# Patient Record
Sex: FEMALE | Race: WHITE | NOT HISPANIC OR LATINO | Employment: OTHER | URBAN - METROPOLITAN AREA
[De-identification: names, ages, dates, MRNs, and addresses within clinical notes are randomized per-mention and may not be internally consistent; named-entity substitution may affect disease eponyms.]

---

## 2023-09-06 ENCOUNTER — MEDICAL CORRESPONDENCE (OUTPATIENT)
Dept: HEALTH INFORMATION MANAGEMENT | Facility: CLINIC | Age: 78
End: 2023-09-06
Payer: COMMERCIAL

## 2023-09-18 ENCOUNTER — ANESTHESIA EVENT (OUTPATIENT)
Dept: SURGERY | Facility: CLINIC | Age: 78
DRG: 165 | End: 2023-09-18
Payer: COMMERCIAL

## 2023-09-18 RX ORDER — UBIDECARENONE 30 MG
1 CAPSULE ORAL DAILY
COMMUNITY

## 2023-09-18 RX ORDER — EZETIMIBE 10 MG/1
10 TABLET ORAL DAILY
COMMUNITY

## 2023-09-18 RX ORDER — CETIRIZINE HYDROCHLORIDE 10 MG/1
10 TABLET ORAL DAILY
COMMUNITY

## 2023-09-18 RX ORDER — MULTIVIT WITH MINERALS/LUTEIN
1000 TABLET ORAL DAILY
COMMUNITY

## 2023-09-18 RX ORDER — BACLOFEN 20 MG
1 TABLET ORAL DAILY
COMMUNITY

## 2023-09-18 ASSESSMENT — LIFESTYLE VARIABLES: TOBACCO_USE: 1

## 2023-09-18 ASSESSMENT — ENCOUNTER SYMPTOMS: DYSRHYTHMIAS: 1

## 2023-09-18 NOTE — PROGRESS NOTES
PTA medications updated by Medication Scribe prior to surgery via phone call with patient (last doses completed by Nurse)     Medication history sources: Patient and H&P  In the past week, patient estimated taking medication this percent of the time: Greater than 90%      Significant changes made to the medication list:  None      Additional medication history information:   None    Medication reconciliation completed by provider prior to medication history? No    Time spent in this activity: 35 minutes    The information provided in this note is only as accurate as the sources available at the time of update(s)      Prior to Admission medications    Medication Sig Last Dose Taking? Auth Provider Long Term End Date   Calcium Carb-Cholecalciferol 600-10 MG-MCG CAPS Take 1 capsule by mouth 2 times daily 9/11/2023 at PM Yes Reported, Patient     cetirizine (ZYRTEC) 10 MG tablet Take 10 mg by mouth daily 9/11/2023 at AM Yes Reported, Patient     coenzyme Q-10 capsule Take 1 capsule by mouth daily 9/11/2023 at PM Yes Reported, Patient     ezetimibe (ZETIA) 10 MG tablet Take 10 mg by mouth daily  at AM Yes Reported, Patient Yes    Magnesium Oxide -Mg Supplement 500 MG TABS Take 1 tablet by mouth daily 9/11/2023 at PM Yes Reported, Patient     rivaroxaban ANTICOAGULANT (XARELTO) 20 MG TABS tablet Take 20 mg by mouth daily (with dinner) 9/15/2023 at PM Yes Reported, Patient Yes    vitamin C (ASCORBIC ACID) 1000 MG TABS Take 1,000 mg by mouth daily 9/11/2023 at AM Yes Reported, Patient

## 2023-09-18 NOTE — ANESTHESIA PREPROCEDURE EVALUATION
Anesthesia Pre-Procedure Evaluation    Patient: Rhianna Collins   MRN: [id number] : 1945        Procedure : Procedure(s):  RIGHT VIDEO ASSISTED THORACOSCOPY WEDGE RESECTION RIGHT LOWER LOBE LUNG NODULE ,  POSSILE RIGHT THORACOTOMY RIGHT LOWER LOBECTOMY          Past Medical History:   Diagnosis Date    Bifascicular block     Fitting and adjustment of cardiac pacemaker     Hyperlipidemia     Multinodular thyroid     Osteopenia     Paroxysmal atrial fibrillation (H)     Prediabetes     Right knee pain     Urinary incontinence     Vitamin D deficiency       Past Surgical History:   Procedure Laterality Date    APPENDECTOMY      cataract removal       COLONOSCOPY      GYN SURGERY      pacemaker placement      TUBAL LIGATION        Not on File   Social History     Tobacco Use    Smoking status: Not on file    Smokeless tobacco: Not on file   Substance Use Topics    Alcohol use: Not on file      Wt Readings from Last 1 Encounters:   No data found for Wt        Anesthesia Evaluation   Pt has had prior anesthetic.     No history of anesthetic complications       ROS/MED HX  ENT/Pulmonary: Comment: Lung mass    PFT's  Lung volumes normal    (+)                tobacco use, Past use,                      Neurologic:  - neg neurologic ROS     Cardiovascular: Comment: H/o bifascicular block  H/o NSVT    Stress test   Normal, EF 64%, no ischemia    (+) Dyslipidemia - -   -  - -   Taking blood thinners           pacemaker,          dysrhythmias, a-fib and Other,             METS/Exercise Tolerance: >4 METS    Hematologic:       Musculoskeletal:       GI/Hepatic:  - neg GI/hepatic ROS     Renal/Genitourinary:  - neg Renal ROS     Endo: Comment: H/o multinodular goiter      Psychiatric/Substance Use:  - neg psychiatric ROS     Infectious Disease:  - neg infectious disease ROS     Malignancy:       Other:            Physical Exam    Airway  airway exam normal      Mallampati: II   TM distance: > 3 FB   Neck ROM: full    Mouth opening: > 3 cm    Respiratory Devices and Support         Dental       (+) Minor Abnormalities - some fillings, tiny chips      Cardiovascular   cardiovascular exam normal          Pulmonary   pulmonary exam normal                OUTSIDE LABS:  CBC: No results found for: WBC, HGB, HCT, PLT  BMP: No results found for: NA, POTASSIUM, CHLORIDE, CO2, BUN, CR, GLC  COAGS: No results found for: PTT, INR, FIBR  POC: No results found for: BGM, HCG, HCGS  HEPATIC: No results found for: ALBUMIN, PROTTOTAL, ALT, AST, GGT, ALKPHOS, BILITOTAL, BILIDIRECT, QUIRINO  OTHER: No results found for: PH, LACT, A1C, DICKSON, PHOS, MAG, LIPASE, AMYLASE, TSH, T4, T3, CRP, SED    Anesthesia Plan    ASA Status:  2    NPO Status:  NPO Appropriate    Anesthesia Type: General.     - Airway: ETT   Induction: Intravenous.   Maintenance: Balanced.   Techniques and Equipment:     - Airway: Video-Laryngoscope, Double lumen ETT       Consents    Anesthesia Plan(s) and associated risks, benefits, and realistic alternatives discussed. Questions answered and patient/representative(s) expressed understanding.     - Discussed:     - Discussed with:  Patient            Postoperative Care    Pain management: IV analgesics, Multi-modal analgesia.   PONV prophylaxis: Ondansetron (or other 5HT-3), Dexamethasone or Solumedrol     Comments:                Pa Rivers MD

## 2023-09-19 ENCOUNTER — ANESTHESIA (OUTPATIENT)
Dept: SURGERY | Facility: CLINIC | Age: 78
DRG: 165 | End: 2023-09-19
Payer: COMMERCIAL

## 2023-09-19 ENCOUNTER — APPOINTMENT (OUTPATIENT)
Dept: GENERAL RADIOLOGY | Facility: CLINIC | Age: 78
DRG: 165 | End: 2023-09-19
Attending: THORACIC SURGERY (CARDIOTHORACIC VASCULAR SURGERY)
Payer: COMMERCIAL

## 2023-09-19 ENCOUNTER — HOSPITAL ENCOUNTER (INPATIENT)
Facility: CLINIC | Age: 78
LOS: 4 days | Discharge: HOME | DRG: 165 | End: 2023-09-23
Attending: THORACIC SURGERY (CARDIOTHORACIC VASCULAR SURGERY) | Admitting: THORACIC SURGERY (CARDIOTHORACIC VASCULAR SURGERY)
Payer: COMMERCIAL

## 2023-09-19 DIAGNOSIS — G89.18 ACUTE POST-OPERATIVE PAIN: Primary | ICD-10-CM

## 2023-09-19 DIAGNOSIS — K59.03 DRUG-INDUCED CONSTIPATION: ICD-10-CM

## 2023-09-19 PROBLEM — R91.1 NODULE OF LOWER LOBE OF RIGHT LUNG: Status: ACTIVE | Noted: 2023-09-19

## 2023-09-19 LAB
ABO/RH(D): NORMAL
ANION GAP SERPL CALCULATED.3IONS-SCNC: 11 MMOL/L (ref 7–15)
ANTIBODY SCREEN: NEGATIVE
BUN SERPL-MCNC: 9.6 MG/DL (ref 8–23)
CALCIUM SERPL-MCNC: 9.2 MG/DL (ref 8.8–10.2)
CHLORIDE SERPL-SCNC: 107 MMOL/L (ref 98–107)
CREAT SERPL-MCNC: 0.74 MG/DL (ref 0.51–0.95)
CREAT SERPL-MCNC: 0.87 MG/DL (ref 0.51–0.95)
DEPRECATED HCO3 PLAS-SCNC: 23 MMOL/L (ref 22–29)
EGFRCR SERPLBLD CKD-EPI 2021: 69 ML/MIN/1.73M2
EGFRCR SERPLBLD CKD-EPI 2021: 83 ML/MIN/1.73M2
ERYTHROCYTE [DISTWIDTH] IN BLOOD BY AUTOMATED COUNT: 13.2 % (ref 10–15)
GLUCOSE BLDC GLUCOMTR-MCNC: 170 MG/DL (ref 70–99)
GLUCOSE SERPL-MCNC: 144 MG/DL (ref 70–99)
HCT VFR BLD AUTO: 38.7 % (ref 35–47)
HGB BLD-MCNC: 12.4 G/DL (ref 11.7–15.7)
HOLD SPECIMEN: NORMAL
HOLD SPECIMEN: NORMAL
INR PPP: 1.03 (ref 0.85–1.15)
MCH RBC QN AUTO: 28.6 PG (ref 26.5–33)
MCHC RBC AUTO-ENTMCNC: 32 G/DL (ref 31.5–36.5)
MCV RBC AUTO: 89 FL (ref 78–100)
PLATELET # BLD AUTO: 272 10E3/UL (ref 150–450)
POTASSIUM SERPL-SCNC: 3.9 MMOL/L (ref 3.4–5.3)
RBC # BLD AUTO: 4.33 10E6/UL (ref 3.8–5.2)
SODIUM SERPL-SCNC: 141 MMOL/L (ref 136–145)
SPECIMEN EXPIRATION DATE: NORMAL
WBC # BLD AUTO: 4.8 10E3/UL (ref 4–11)

## 2023-09-19 PROCEDURE — 88305 TISSUE EXAM BY PATHOLOGIST: CPT | Mod: TC | Performed by: THORACIC SURGERY (CARDIOTHORACIC VASCULAR SURGERY)

## 2023-09-19 PROCEDURE — 272N000001 HC OR GENERAL SUPPLY STERILE: Performed by: THORACIC SURGERY (CARDIOTHORACIC VASCULAR SURGERY)

## 2023-09-19 PROCEDURE — P9045 ALBUMIN (HUMAN), 5%, 250 ML: HCPCS | Mod: JZ | Performed by: ANESTHESIOLOGY

## 2023-09-19 PROCEDURE — 36415 COLL VENOUS BLD VENIPUNCTURE: CPT | Performed by: PHYSICIAN ASSISTANT

## 2023-09-19 PROCEDURE — 250N000011 HC RX IP 250 OP 636: Mod: JZ | Performed by: NURSE ANESTHETIST, CERTIFIED REGISTERED

## 2023-09-19 PROCEDURE — 07B70ZZ EXCISION OF THORAX LYMPHATIC, OPEN APPROACH: ICD-10-PCS | Performed by: THORACIC SURGERY (CARDIOTHORACIC VASCULAR SURGERY)

## 2023-09-19 PROCEDURE — 0BBF4ZZ EXCISION OF RIGHT LOWER LUNG LOBE, PERCUTANEOUS ENDOSCOPIC APPROACH: ICD-10-PCS | Performed by: THORACIC SURGERY (CARDIOTHORACIC VASCULAR SURGERY)

## 2023-09-19 PROCEDURE — 88331 PATH CONSLTJ SURG 1 BLK 1SPC: CPT | Mod: TC | Performed by: THORACIC SURGERY (CARDIOTHORACIC VASCULAR SURGERY)

## 2023-09-19 PROCEDURE — 250N000009 HC RX 250: Performed by: THORACIC SURGERY (CARDIOTHORACIC VASCULAR SURGERY)

## 2023-09-19 PROCEDURE — 250N000011 HC RX IP 250 OP 636: Performed by: PHYSICIAN ASSISTANT

## 2023-09-19 PROCEDURE — 250N000011 HC RX IP 250 OP 636: Mod: JZ

## 2023-09-19 PROCEDURE — 86850 RBC ANTIBODY SCREEN: CPT | Performed by: THORACIC SURGERY (CARDIOTHORACIC VASCULAR SURGERY)

## 2023-09-19 PROCEDURE — 258N000003 HC RX IP 258 OP 636: Performed by: PHYSICIAN ASSISTANT

## 2023-09-19 PROCEDURE — P9045 ALBUMIN (HUMAN), 5%, 250 ML: HCPCS | Mod: JZ

## 2023-09-19 PROCEDURE — 250N000009 HC RX 250: Performed by: NURSE ANESTHETIST, CERTIFIED REGISTERED

## 2023-09-19 PROCEDURE — 250N000011 HC RX IP 250 OP 636: Mod: JZ | Performed by: ANESTHESIOLOGY

## 2023-09-19 PROCEDURE — 36415 COLL VENOUS BLD VENIPUNCTURE: CPT | Performed by: THORACIC SURGERY (CARDIOTHORACIC VASCULAR SURGERY)

## 2023-09-19 PROCEDURE — 0BTF0ZZ RESECTION OF RIGHT LOWER LUNG LOBE, OPEN APPROACH: ICD-10-PCS | Performed by: THORACIC SURGERY (CARDIOTHORACIC VASCULAR SURGERY)

## 2023-09-19 PROCEDURE — 999N000065 XR CHEST PORT 1 VIEW

## 2023-09-19 PROCEDURE — 85610 PROTHROMBIN TIME: CPT | Performed by: THORACIC SURGERY (CARDIOTHORACIC VASCULAR SURGERY)

## 2023-09-19 PROCEDURE — 999N000141 HC STATISTIC PRE-PROCEDURE NURSING ASSESSMENT: Performed by: THORACIC SURGERY (CARDIOTHORACIC VASCULAR SURGERY)

## 2023-09-19 PROCEDURE — 370N000017 HC ANESTHESIA TECHNICAL FEE, PER MIN: Performed by: THORACIC SURGERY (CARDIOTHORACIC VASCULAR SURGERY)

## 2023-09-19 PROCEDURE — 360N000077 HC SURGERY LEVEL 4, PER MIN: Performed by: THORACIC SURGERY (CARDIOTHORACIC VASCULAR SURGERY)

## 2023-09-19 PROCEDURE — 710N000010 HC RECOVERY PHASE 1, LEVEL 2, PER MIN: Performed by: THORACIC SURGERY (CARDIOTHORACIC VASCULAR SURGERY)

## 2023-09-19 PROCEDURE — 250N000009 HC RX 250

## 2023-09-19 PROCEDURE — 120N000001 HC R&B MED SURG/OB

## 2023-09-19 PROCEDURE — 85027 COMPLETE CBC AUTOMATED: CPT | Performed by: THORACIC SURGERY (CARDIOTHORACIC VASCULAR SURGERY)

## 2023-09-19 PROCEDURE — 250N000013 HC RX MED GY IP 250 OP 250 PS 637: Performed by: PHYSICIAN ASSISTANT

## 2023-09-19 PROCEDURE — 82565 ASSAY OF CREATININE: CPT | Performed by: PHYSICIAN ASSISTANT

## 2023-09-19 PROCEDURE — 0BBF0ZX EXCISION OF RIGHT LOWER LUNG LOBE, OPEN APPROACH, DIAGNOSTIC: ICD-10-PCS | Performed by: THORACIC SURGERY (CARDIOTHORACIC VASCULAR SURGERY)

## 2023-09-19 PROCEDURE — 258N000003 HC RX IP 258 OP 636

## 2023-09-19 PROCEDURE — 80048 BASIC METABOLIC PNL TOTAL CA: CPT | Performed by: THORACIC SURGERY (CARDIOTHORACIC VASCULAR SURGERY)

## 2023-09-19 PROCEDURE — 250N000011 HC RX IP 250 OP 636: Performed by: THORACIC SURGERY (CARDIOTHORACIC VASCULAR SURGERY)

## 2023-09-19 PROCEDURE — 250N000025 HC SEVOFLURANE, PER MIN: Performed by: THORACIC SURGERY (CARDIOTHORACIC VASCULAR SURGERY)

## 2023-09-19 PROCEDURE — 86901 BLOOD TYPING SEROLOGIC RH(D): CPT | Performed by: THORACIC SURGERY (CARDIOTHORACIC VASCULAR SURGERY)

## 2023-09-19 PROCEDURE — 258N000003 HC RX IP 258 OP 636: Performed by: ANESTHESIOLOGY

## 2023-09-19 RX ORDER — HYDROMORPHONE HCL IN WATER/PF 6 MG/30 ML
0.4 PATIENT CONTROLLED ANALGESIA SYRINGE INTRAVENOUS
Status: DISCONTINUED | OUTPATIENT
Start: 2023-09-19 | End: 2023-09-23 | Stop reason: HOSPADM

## 2023-09-19 RX ORDER — CALCIUM CARBONATE 500 MG/1
500 TABLET, CHEWABLE ORAL 4 TIMES DAILY PRN
Status: DISCONTINUED | OUTPATIENT
Start: 2023-09-19 | End: 2023-09-23 | Stop reason: HOSPADM

## 2023-09-19 RX ORDER — LIDOCAINE 40 MG/G
CREAM TOPICAL
Status: DISCONTINUED | OUTPATIENT
Start: 2023-09-19 | End: 2023-09-19

## 2023-09-19 RX ORDER — LIDOCAINE HYDROCHLORIDE 20 MG/ML
INJECTION, SOLUTION INFILTRATION; PERINEURAL PRN
Status: DISCONTINUED | OUTPATIENT
Start: 2023-09-19 | End: 2023-09-19

## 2023-09-19 RX ORDER — LIDOCAINE 40 MG/G
CREAM TOPICAL
Status: DISCONTINUED | OUTPATIENT
Start: 2023-09-19 | End: 2023-09-23 | Stop reason: HOSPADM

## 2023-09-19 RX ORDER — DIPHENHYDRAMINE HCL 12.5MG/5ML
12.5 LIQUID (ML) ORAL EVERY 6 HOURS PRN
Status: DISCONTINUED | OUTPATIENT
Start: 2023-09-19 | End: 2023-09-23 | Stop reason: HOSPADM

## 2023-09-19 RX ORDER — ENOXAPARIN SODIUM 100 MG/ML
40 INJECTION SUBCUTANEOUS EVERY 24 HOURS
Status: DISCONTINUED | OUTPATIENT
Start: 2023-09-20 | End: 2023-09-23 | Stop reason: HOSPADM

## 2023-09-19 RX ORDER — CETIRIZINE HYDROCHLORIDE 10 MG/1
10 TABLET ORAL DAILY
Status: DISCONTINUED | OUTPATIENT
Start: 2023-09-20 | End: 2023-09-23 | Stop reason: HOSPADM

## 2023-09-19 RX ORDER — AMOXICILLIN 250 MG
1 CAPSULE ORAL 2 TIMES DAILY
Status: DISCONTINUED | OUTPATIENT
Start: 2023-09-19 | End: 2023-09-23 | Stop reason: HOSPADM

## 2023-09-19 RX ORDER — SODIUM CHLORIDE 9 MG/ML
INJECTION, SOLUTION INTRAVENOUS CONTINUOUS
Status: DISCONTINUED | OUTPATIENT
Start: 2023-09-19 | End: 2023-09-21

## 2023-09-19 RX ORDER — CEFAZOLIN SODIUM/WATER 2 G/20 ML
2 SYRINGE (ML) INTRAVENOUS SEE ADMIN INSTRUCTIONS
Status: DISCONTINUED | OUTPATIENT
Start: 2023-09-19 | End: 2023-09-19 | Stop reason: HOSPADM

## 2023-09-19 RX ORDER — NALOXONE HYDROCHLORIDE 0.4 MG/ML
0.2 INJECTION, SOLUTION INTRAMUSCULAR; INTRAVENOUS; SUBCUTANEOUS
Status: DISCONTINUED | OUTPATIENT
Start: 2023-09-19 | End: 2023-09-23 | Stop reason: HOSPADM

## 2023-09-19 RX ORDER — PROCHLORPERAZINE MALEATE 5 MG
5 TABLET ORAL EVERY 6 HOURS PRN
Status: DISCONTINUED | OUTPATIENT
Start: 2023-09-19 | End: 2023-09-23 | Stop reason: HOSPADM

## 2023-09-19 RX ORDER — ONDANSETRON 4 MG/1
4 TABLET, ORALLY DISINTEGRATING ORAL EVERY 30 MIN PRN
Status: DISCONTINUED | OUTPATIENT
Start: 2023-09-19 | End: 2023-09-19 | Stop reason: HOSPADM

## 2023-09-19 RX ORDER — HYDROMORPHONE HCL IN WATER/PF 6 MG/30 ML
0.2 PATIENT CONTROLLED ANALGESIA SYRINGE INTRAVENOUS
Status: DISCONTINUED | OUTPATIENT
Start: 2023-09-19 | End: 2023-09-23 | Stop reason: HOSPADM

## 2023-09-19 RX ORDER — VASOPRESSIN IN 0.9 % NACL 2 UNIT/2ML
SYRINGE (ML) INTRAVENOUS PRN
Status: DISCONTINUED | OUTPATIENT
Start: 2023-09-19 | End: 2023-09-19

## 2023-09-19 RX ORDER — ONDANSETRON 2 MG/ML
4 INJECTION INTRAMUSCULAR; INTRAVENOUS EVERY 6 HOURS PRN
Status: DISCONTINUED | OUTPATIENT
Start: 2023-09-19 | End: 2023-09-23 | Stop reason: HOSPADM

## 2023-09-19 RX ORDER — NALOXONE HYDROCHLORIDE 0.4 MG/ML
0.4 INJECTION, SOLUTION INTRAMUSCULAR; INTRAVENOUS; SUBCUTANEOUS
Status: DISCONTINUED | OUTPATIENT
Start: 2023-09-19 | End: 2023-09-23 | Stop reason: HOSPADM

## 2023-09-19 RX ORDER — SODIUM CHLORIDE, SODIUM LACTATE, POTASSIUM CHLORIDE, CALCIUM CHLORIDE 600; 310; 30; 20 MG/100ML; MG/100ML; MG/100ML; MG/100ML
INJECTION, SOLUTION INTRAVENOUS CONTINUOUS
Status: DISCONTINUED | OUTPATIENT
Start: 2023-09-19 | End: 2023-09-19 | Stop reason: HOSPADM

## 2023-09-19 RX ORDER — HYDROMORPHONE HCL IN WATER/PF 6 MG/30 ML
0.4 PATIENT CONTROLLED ANALGESIA SYRINGE INTRAVENOUS EVERY 5 MIN PRN
Status: DISCONTINUED | OUTPATIENT
Start: 2023-09-19 | End: 2023-09-19 | Stop reason: HOSPADM

## 2023-09-19 RX ORDER — PHENYLEPHRINE HCL IN 0.9% NACL 50MG/250ML
.5-1.25 PLASTIC BAG, INJECTION (ML) INTRAVENOUS CONTINUOUS
Status: DISCONTINUED | OUTPATIENT
Start: 2023-09-19 | End: 2023-09-19 | Stop reason: HOSPADM

## 2023-09-19 RX ORDER — GINSENG 100 MG
CAPSULE ORAL
Status: DISCONTINUED | OUTPATIENT
Start: 2023-09-19 | End: 2023-09-23 | Stop reason: HOSPADM

## 2023-09-19 RX ORDER — ONDANSETRON 2 MG/ML
INJECTION INTRAMUSCULAR; INTRAVENOUS PRN
Status: DISCONTINUED | OUTPATIENT
Start: 2023-09-19 | End: 2023-09-19

## 2023-09-19 RX ORDER — HYDROMORPHONE HCL IN WATER/PF 6 MG/30 ML
0.2 PATIENT CONTROLLED ANALGESIA SYRINGE INTRAVENOUS EVERY 5 MIN PRN
Status: DISCONTINUED | OUTPATIENT
Start: 2023-09-19 | End: 2023-09-19 | Stop reason: HOSPADM

## 2023-09-19 RX ORDER — EPHEDRINE SULFATE 50 MG/ML
INJECTION, SOLUTION INTRAMUSCULAR; INTRAVENOUS; SUBCUTANEOUS PRN
Status: DISCONTINUED | OUTPATIENT
Start: 2023-09-19 | End: 2023-09-19

## 2023-09-19 RX ORDER — HYDROMORPHONE HYDROCHLORIDE 2 MG/1
2 TABLET ORAL
Status: DISCONTINUED | OUTPATIENT
Start: 2023-09-19 | End: 2023-09-23 | Stop reason: HOSPADM

## 2023-09-19 RX ORDER — FENTANYL CITRATE 50 UG/ML
INJECTION, SOLUTION INTRAMUSCULAR; INTRAVENOUS PRN
Status: DISCONTINUED | OUTPATIENT
Start: 2023-09-19 | End: 2023-09-19

## 2023-09-19 RX ORDER — PROPOFOL 10 MG/ML
INJECTION, EMULSION INTRAVENOUS PRN
Status: DISCONTINUED | OUTPATIENT
Start: 2023-09-19 | End: 2023-09-19

## 2023-09-19 RX ORDER — ONDANSETRON 2 MG/ML
4 INJECTION INTRAMUSCULAR; INTRAVENOUS EVERY 30 MIN PRN
Status: DISCONTINUED | OUTPATIENT
Start: 2023-09-19 | End: 2023-09-19 | Stop reason: HOSPADM

## 2023-09-19 RX ORDER — KETOROLAC TROMETHAMINE 15 MG/ML
15 INJECTION, SOLUTION INTRAMUSCULAR; INTRAVENOUS EVERY 6 HOURS PRN
Status: DISCONTINUED | OUTPATIENT
Start: 2023-09-19 | End: 2023-09-23 | Stop reason: HOSPADM

## 2023-09-19 RX ORDER — CEFAZOLIN SODIUM/WATER 2 G/20 ML
2 SYRINGE (ML) INTRAVENOUS
Status: COMPLETED | OUTPATIENT
Start: 2023-09-19 | End: 2023-09-19

## 2023-09-19 RX ORDER — FAMOTIDINE 20 MG/1
20 TABLET, FILM COATED ORAL 2 TIMES DAILY
Status: DISCONTINUED | OUTPATIENT
Start: 2023-09-19 | End: 2023-09-23 | Stop reason: HOSPADM

## 2023-09-19 RX ORDER — FENTANYL CITRATE 0.05 MG/ML
50 INJECTION, SOLUTION INTRAMUSCULAR; INTRAVENOUS EVERY 5 MIN PRN
Status: DISCONTINUED | OUTPATIENT
Start: 2023-09-19 | End: 2023-09-19 | Stop reason: HOSPADM

## 2023-09-19 RX ORDER — EZETIMIBE 10 MG/1
10 TABLET ORAL DAILY
Status: DISCONTINUED | OUTPATIENT
Start: 2023-09-20 | End: 2023-09-23 | Stop reason: HOSPADM

## 2023-09-19 RX ORDER — DIPHENHYDRAMINE HYDROCHLORIDE 50 MG/ML
12.5 INJECTION INTRAMUSCULAR; INTRAVENOUS EVERY 6 HOURS PRN
Status: DISCONTINUED | OUTPATIENT
Start: 2023-09-19 | End: 2023-09-23 | Stop reason: HOSPADM

## 2023-09-19 RX ORDER — ONDANSETRON 4 MG/1
4 TABLET, ORALLY DISINTEGRATING ORAL EVERY 6 HOURS PRN
Status: DISCONTINUED | OUTPATIENT
Start: 2023-09-19 | End: 2023-09-23 | Stop reason: HOSPADM

## 2023-09-19 RX ORDER — NITROGLYCERIN 0.4 MG/1
0.4 TABLET SUBLINGUAL EVERY 5 MIN PRN
Status: DISCONTINUED | OUTPATIENT
Start: 2023-09-19 | End: 2023-09-20

## 2023-09-19 RX ORDER — ACETAMINOPHEN 500 MG
1000 TABLET ORAL EVERY 6 HOURS
Status: DISCONTINUED | OUTPATIENT
Start: 2023-09-19 | End: 2023-09-23 | Stop reason: HOSPADM

## 2023-09-19 RX ORDER — POLYETHYLENE GLYCOL 3350 17 G/17G
17 POWDER, FOR SOLUTION ORAL DAILY
Status: DISCONTINUED | OUTPATIENT
Start: 2023-09-20 | End: 2023-09-23 | Stop reason: HOSPADM

## 2023-09-19 RX ORDER — FENTANYL CITRATE 0.05 MG/ML
25 INJECTION, SOLUTION INTRAMUSCULAR; INTRAVENOUS EVERY 5 MIN PRN
Status: DISCONTINUED | OUTPATIENT
Start: 2023-09-19 | End: 2023-09-19 | Stop reason: HOSPADM

## 2023-09-19 RX ADMIN — Medication 15 MG: at 10:17

## 2023-09-19 RX ADMIN — ALBUMIN HUMAN 12.5 G: 0.05 INJECTION, SOLUTION INTRAVENOUS at 15:37

## 2023-09-19 RX ADMIN — ACETAMINOPHEN 1000 MG: 500 TABLET, FILM COATED ORAL at 17:18

## 2023-09-19 RX ADMIN — Medication 1 UNITS: at 11:39

## 2023-09-19 RX ADMIN — Medication 5 MG: at 10:38

## 2023-09-19 RX ADMIN — SODIUM CHLORIDE, POTASSIUM CHLORIDE, SODIUM LACTATE AND CALCIUM CHLORIDE: 600; 310; 30; 20 INJECTION, SOLUTION INTRAVENOUS at 11:40

## 2023-09-19 RX ADMIN — PHENYLEPHRINE HYDROCHLORIDE 200 MCG: 10 INJECTION INTRAVENOUS at 11:51

## 2023-09-19 RX ADMIN — ACETAMINOPHEN 1000 MG: 500 TABLET, FILM COATED ORAL at 23:00

## 2023-09-19 RX ADMIN — ROCURONIUM BROMIDE 5 MG: 50 INJECTION, SOLUTION INTRAVENOUS at 11:37

## 2023-09-19 RX ADMIN — Medication 1 UNITS: at 10:48

## 2023-09-19 RX ADMIN — FENTANYL CITRATE 50 MCG: 50 INJECTION, SOLUTION INTRAMUSCULAR; INTRAVENOUS at 09:46

## 2023-09-19 RX ADMIN — Medication 0.5 UNITS: at 10:11

## 2023-09-19 RX ADMIN — PROPOFOL 170 MG: 10 INJECTION, EMULSION INTRAVENOUS at 09:46

## 2023-09-19 RX ADMIN — PHENYLEPHRINE HYDROCHLORIDE 100 MCG: 10 INJECTION INTRAVENOUS at 11:08

## 2023-09-19 RX ADMIN — Medication 5 MG: at 10:29

## 2023-09-19 RX ADMIN — Medication 1 UNITS: at 11:58

## 2023-09-19 RX ADMIN — FAMOTIDINE 20 MG: 20 TABLET ORAL at 19:51

## 2023-09-19 RX ADMIN — SODIUM CHLORIDE: 9 INJECTION, SOLUTION INTRAVENOUS at 17:06

## 2023-09-19 RX ADMIN — PHENYLEPHRINE HYDROCHLORIDE 100 MCG: 10 INJECTION INTRAVENOUS at 10:01

## 2023-09-19 RX ADMIN — KETOROLAC TROMETHAMINE 15 MG: 15 INJECTION INTRAMUSCULAR; INTRAVENOUS at 17:17

## 2023-09-19 RX ADMIN — FENTANYL CITRATE 50 MCG: 50 INJECTION, SOLUTION INTRAMUSCULAR; INTRAVENOUS at 12:25

## 2023-09-19 RX ADMIN — ALBUMIN HUMAN: 0.05 INJECTION, SOLUTION INTRAVENOUS at 10:17

## 2023-09-19 RX ADMIN — PHENYLEPHRINE HYDROCHLORIDE 200 MCG: 10 INJECTION INTRAVENOUS at 10:17

## 2023-09-19 RX ADMIN — LIDOCAINE HYDROCHLORIDE 100 MG: 20 INJECTION, SOLUTION INFILTRATION; PERINEURAL at 09:46

## 2023-09-19 RX ADMIN — PHENYLEPHRINE HYDROCHLORIDE 100 MCG: 10 INJECTION INTRAVENOUS at 11:32

## 2023-09-19 RX ADMIN — PHENYLEPHRINE HYDROCHLORIDE 100 MCG: 10 INJECTION INTRAVENOUS at 11:03

## 2023-09-19 RX ADMIN — PHENYLEPHRINE HYDROCHLORIDE 200 MCG: 10 INJECTION INTRAVENOUS at 09:52

## 2023-09-19 RX ADMIN — SENNOSIDES AND DOCUSATE SODIUM 1 TABLET: 50; 8.6 TABLET ORAL at 19:51

## 2023-09-19 RX ADMIN — ROCURONIUM BROMIDE 10 MG: 50 INJECTION, SOLUTION INTRAVENOUS at 10:54

## 2023-09-19 RX ADMIN — ONDANSETRON 4 MG: 2 INJECTION INTRAMUSCULAR; INTRAVENOUS at 11:28

## 2023-09-19 RX ADMIN — PHENYLEPHRINE HYDROCHLORIDE 100 MCG: 10 INJECTION INTRAVENOUS at 12:31

## 2023-09-19 RX ADMIN — PHENYLEPHRINE HYDROCHLORIDE 100 MCG: 10 INJECTION INTRAVENOUS at 11:04

## 2023-09-19 RX ADMIN — SUGAMMADEX 200 MG: 100 INJECTION, SOLUTION INTRAVENOUS at 12:21

## 2023-09-19 RX ADMIN — PHENYLEPHRINE HYDROCHLORIDE 100 MCG: 10 INJECTION INTRAVENOUS at 09:55

## 2023-09-19 RX ADMIN — Medication 10 MG: at 10:44

## 2023-09-19 RX ADMIN — Medication 0.5 UNITS: at 10:14

## 2023-09-19 RX ADMIN — ALBUMIN HUMAN: 0.05 INJECTION, SOLUTION INTRAVENOUS at 10:39

## 2023-09-19 RX ADMIN — ROCURONIUM BROMIDE 10 MG: 50 INJECTION, SOLUTION INTRAVENOUS at 10:22

## 2023-09-19 RX ADMIN — Medication 2 G: at 09:40

## 2023-09-19 RX ADMIN — SODIUM CHLORIDE, POTASSIUM CHLORIDE, SODIUM LACTATE AND CALCIUM CHLORIDE: 600; 310; 30; 20 INJECTION, SOLUTION INTRAVENOUS at 09:37

## 2023-09-19 RX ADMIN — Medication 5 MG: at 10:41

## 2023-09-19 RX ADMIN — PHENYLEPHRINE HYDROCHLORIDE 200 MCG: 10 INJECTION INTRAVENOUS at 10:10

## 2023-09-19 RX ADMIN — PHENYLEPHRINE HYDROCHLORIDE 100 MCG: 10 INJECTION INTRAVENOUS at 11:39

## 2023-09-19 RX ADMIN — Medication 1 UNITS: at 10:16

## 2023-09-19 RX ADMIN — PHENYLEPHRINE HYDROCHLORIDE 0.3 MCG/KG/MIN: 10 INJECTION INTRAVENOUS at 10:00

## 2023-09-19 RX ADMIN — PHENYLEPHRINE HYDROCHLORIDE 200 MCG: 10 INJECTION INTRAVENOUS at 10:05

## 2023-09-19 RX ADMIN — Medication 10 MG: at 10:18

## 2023-09-19 RX ADMIN — ROCURONIUM BROMIDE 40 MG: 50 INJECTION, SOLUTION INTRAVENOUS at 09:47

## 2023-09-19 RX ADMIN — Medication 0.3 MCG/KG/MIN: at 13:48

## 2023-09-19 RX ADMIN — ROCURONIUM BROMIDE 15 MG: 50 INJECTION, SOLUTION INTRAVENOUS at 11:44

## 2023-09-19 RX ADMIN — KETOROLAC TROMETHAMINE 15 MG: 15 INJECTION INTRAMUSCULAR; INTRAVENOUS at 23:01

## 2023-09-19 RX ADMIN — PHENYLEPHRINE HYDROCHLORIDE 100 MCG: 10 INJECTION INTRAVENOUS at 10:14

## 2023-09-19 ASSESSMENT — ACTIVITIES OF DAILY LIVING (ADL)
ADLS_ACUITY_SCORE: 20
ADLS_ACUITY_SCORE: 20
ADLS_ACUITY_SCORE: 22
ADLS_ACUITY_SCORE: 20

## 2023-09-19 NOTE — OP NOTE
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  This was hypermetabolic on the PET/CT scan.  There is no evidence of martina or distant disease.  Pulmonary function tests are normal.  Options of diagnostic procedure and treatment were discussed in details and it was elected to proceed with resection for diagnosis and treatment      DESCRIPTION OF PROCEDURE: The patient was brought to the operating room and placed in supine position.  Under general anesthesia with a double-lumen endotracheal tube, the patient was placed in the left lateral decubitus position.  The right chest was prepared and draped in usual fashion using ChloraPrep.  The ventilation of the right lung was discontinued.  3 thoracoscopic incisions were made in usual fashion.  The video thoracoscope was introduced.  The masslike opacity in the right lower lobe lung was easily identified.  Anteriorly near the fissure, there was another area which appeared abnormal and this was resection with 2 applications of [place] 60 gold stapling device.  The larger area could not be resected safely with thoracoscopy.  The posterior thoracoscopic incision was enlarged and limited thoracotomy.  The serratus anterior muscle was spared.  Pleural space was entered and the fifth costal space preserving the integrity of the rib.  Using multiple application of [place]  60 gold stapling device a wedge section of the masslike area was done.  This was submitted for frozen section.  Frozen section shows a mucinous adenocarcinoma.  The inferior pulmonary ligament was mobilized.  Mediastinal lymph node dissection was performed excising the subcarinal and right paratracheal lymph node as well as an interlobar lymph node.  There was no lymph node at the tracheobronchial angle or inferior pulmonary ligament.  The fissure was entered exposing the pulmonary artery.  Fissure were complete anteriorly and posteriorly with application of [place] 60 gold stapling device.  Pulmonary artery was dissected circumferentially.  The branch of the pulmonary artery to the superior segment of the right lower lobe lung was stapled application of [place] 35 vascular stapling device.  The basilar trunk of the pulmonary artery was dissected and stable in similar fashion.  Then the inferior pulmonary vein was stapled application of [place] 35 vascular stapling device.  The origin of the right lower bronchus was dissected circumferentially and stapled beyond its origin with a TA 30 3.5 stapling device.  The bronchus was divided distal to the staple line completing right lower lobectomy.  The bronchial stump was airtight at pressure 20 cmH2O with an excellent reexpansion of the middle and upper lobe.  Hemostasis was verified and was excellent.  Through a separate stab wound a 28 straight chest was placed and sutured skin with 2 silk suture.  On-Q catheter were placed.  30 cc of Marcaine 0.5% without epinephrine was injected intercostal blocks.  The incision was closed with pericostal suture Vicryl No. 1, running #1 Vicryl muscular layer, running 2-0 Vicryl subcu tissue and the skin closed with INSORB staples.    ESTIMATED BLOOD LOSS: 25 mL    COUNTS: Needle and sponge count is correct    Nathalie Leahy PA-C  was the first assistant during the procedure. Her role as first assistant during the procedure was essential  and necessary to accomplishing the steps described in the procedure above, providing exposure, retraction and handling of the scope.     Peter Patel MD

## 2023-09-19 NOTE — PLAN OF CARE
Arrived @ 1700     Orientations: A&Ox4   Vitals/Pain: VSS (soft BP), LS diminished. RA. Pain 6/10   Tele: SR w/ BBB (pacemaker)   Lines/Drains: PIV L Infusing   Skin/Wounds: R thora incision , R CT (-20 suction)  GI/: Purewick in place, No BM (passing gas and + BS). Clear liquid diet   Labs: Abnormal/Trends: BG (170)  Electrolyte Replacement- N/A  Ambulation/Assist: NOOB   Plan: Monitor incision, On-q pump site, CT site and output, and pain. Toradol and tylenol given, ice applied to incision. Spouse and daughter at bedside. No AL present, clamps at bedside, IS encouraged and education provided

## 2023-09-19 NOTE — PROVIDER NOTIFICATION
FREDY Rivers bedside. Pt unable to maintain systolic over 90 with out liss support. Dr Patel text. 250 Albumin ordered. Pt A&O x4. Lungs clear bilaterally.

## 2023-09-19 NOTE — PROGRESS NOTES
Pt vitally stable. Silvestre DANIEL bedside ordered to titrate liss down to see if patient tolerates. Tolerating sitting up  45 deg. Vss. ctm

## 2023-09-19 NOTE — PROGRESS NOTES
FREDY Rivers bedside. Pt stable on liss drip. Unable to discontinue liss and keep systolic above 90. CTM and give more time per MD Rivers.

## 2023-09-19 NOTE — ANESTHESIA PROCEDURE NOTES
Airway       Patient location during procedure: OR       Procedure Start/Stop Times: 9/19/2023 9:50 AM  Staff -        Anesthesiologist:  Pa Rivers MD       CRNA: Bennie Tamayo APRN CRNA       Other Anesthesia Staff: Araseli Acosta       Performed By: SRNA  Consent for Airway        Urgency: elective  Indications and Patient Condition       Indications for airway management: jasmin-procedural       Induction type:intravenous       Mask difficulty assessment: 2 - vent by mask + OA or adjuvant +/- NMBA    Final Airway Details       Final airway type: endotracheal airway       Successful airway: ETT - double lumen left  Endotracheal Airway Details        Cuffed: yes       Successful intubation technique: video laryngoscopy       VL Blade Size: Glidescope 3       Grade View of Cords: 1       Adjucts: stylet       Position: Right       Bite block used: None       ETT Double lumen (fr): 35    Post intubation assessment        Placement verified by: capnometry, equal breath sounds and chest rise        Number of attempts at approach: 1       Number of other approaches attempted: 0       Secured with: silk tape       Ease of procedure: easy       Dentition: Intact and Unchanged    Medication(s) Administered   Medication Administration Time: 9/19/2023 9:50 AM

## 2023-09-19 NOTE — PROGRESS NOTES
DR Rivers bedside. 250 Albumin complete. Leonid drip stopped @1538. VSS. FREDY Rivers cleared to transfer to floor.

## 2023-09-19 NOTE — ANESTHESIA CARE TRANSFER NOTE
Patient: Rhianna Collins    Procedure: Procedure(s):  RIGHT VIDEO ASSISTED THORACOSCOPIC SURGERY WEDGE RESECTION OF RIGHT LOWER LOBE LUNG NODULE,  CONVERTED TO RIGHT THORACOTOMY, MEDIASTINAL LYMPH NODE DISSECTIONS, RIGHT LOWER LOBECTOMY       Diagnosis: Right lower lobe lung mass [R91.8]  Diagnosis Additional Information: No value filed.    Anesthesia Type:   General     Note:    Oropharynx: oropharynx clear of all foreign objects and spontaneously breathing  Level of Consciousness: awake  Oxygen Supplementation: face mask  Level of Supplemental Oxygen (L/min / FiO2): 6  Independent Airway: airway patency satisfactory and stable  Dentition: dentition unchanged  Vital Signs Stable: post-procedure vital signs reviewed and stable  Report to RN Given: handoff report given  Patient transferred to: PACU    Handoff Report: Identifed the Patient, Identified the Reponsible Provider, Reviewed the pertinent medical history, Discussed the surgical course, Reviewed Intra-OP anesthesia mangement and issues during anesthesia, Set expectations for post-procedure period and Allowed opportunity for questions and acknowledgement of understanding      Vitals:  Vitals Value Taken Time   /68 09/19/23 1239   Temp     Pulse 95 09/19/23 1243   Resp 22 09/19/23 1243   SpO2 99 % 09/19/23 1243       Electronically Signed By: LEXI Etienne CRNA  September 19, 2023  12:44 PM

## 2023-09-19 NOTE — ANESTHESIA POSTPROCEDURE EVALUATION
Patient: Rhianna Collins    Procedure: Procedure(s):  RIGHT VIDEO ASSISTED THORACOSCOPIC SURGERY WEDGE RESECTION OF RIGHT LOWER LOBE LUNG NODULE,  CONVERTED TO RIGHT THORACOTOMY, MEDIASTINAL LYMPH NODE DISSECTIONS, RIGHT LOWER LOBECTOMY       Anesthesia Type:  General    Note:  Disposition: Admission   Postop Pain Control: Uneventful            Sign Out: Well controlled pain   PONV: No   Neuro/Psych: Uneventful            Sign Out: Acceptable/Baseline neuro status   Airway/Respiratory: Uneventful            Sign Out: Acceptable/Baseline resp. status   CV/Hemodynamics:             Sign Out: Acceptable CV status; No obvious hypovolemia; No obvious fluid overload   Other NRE: NONE   DID A NON-ROUTINE EVENT OCCUR? No    Event details/Postop Comments:  Took some time in PACU to wean phenylephrine drip. Eventually with time and albumin, was able to discontinue drip without hypotension. Doing well. Please call with questions.           Last vitals:  Vitals Value Taken Time   BP 99/63 09/19/23 1640   Temp     Pulse 80 09/19/23 1645   Resp 27 09/19/23 1645   SpO2 99 % 09/19/23 1644   Vitals shown include unvalidated device data.    Electronically Signed By: Pa Rivers MD  September 19, 2023  4:59 PM

## 2023-09-20 ENCOUNTER — APPOINTMENT (OUTPATIENT)
Dept: GENERAL RADIOLOGY | Facility: CLINIC | Age: 78
DRG: 165 | End: 2023-09-20
Attending: PHYSICIAN ASSISTANT
Payer: COMMERCIAL

## 2023-09-20 LAB
ANION GAP SERPL CALCULATED.3IONS-SCNC: 10 MMOL/L (ref 7–15)
BUN SERPL-MCNC: 14 MG/DL (ref 8–23)
CALCIUM SERPL-MCNC: 8.3 MG/DL (ref 8.8–10.2)
CHLORIDE SERPL-SCNC: 104 MMOL/L (ref 98–107)
CREAT SERPL-MCNC: 0.87 MG/DL (ref 0.51–0.95)
DEPRECATED HCO3 PLAS-SCNC: 23 MMOL/L (ref 22–29)
EGFRCR SERPLBLD CKD-EPI 2021: 69 ML/MIN/1.73M2
GLUCOSE BLDC GLUCOMTR-MCNC: 128 MG/DL (ref 70–99)
GLUCOSE SERPL-MCNC: 130 MG/DL (ref 70–99)
HGB BLD-MCNC: 10.3 G/DL (ref 11.7–15.7)
POTASSIUM SERPL-SCNC: 3.8 MMOL/L (ref 3.4–5.3)
SODIUM SERPL-SCNC: 137 MMOL/L (ref 136–145)

## 2023-09-20 PROCEDURE — 85018 HEMOGLOBIN: CPT | Performed by: PHYSICIAN ASSISTANT

## 2023-09-20 PROCEDURE — 36415 COLL VENOUS BLD VENIPUNCTURE: CPT | Performed by: PHYSICIAN ASSISTANT

## 2023-09-20 PROCEDURE — 88309 TISSUE EXAM BY PATHOLOGIST: CPT | Mod: 26 | Performed by: PATHOLOGY

## 2023-09-20 PROCEDURE — 88331 PATH CONSLTJ SURG 1 BLK 1SPC: CPT | Mod: 26 | Performed by: PATHOLOGY

## 2023-09-20 PROCEDURE — 80048 BASIC METABOLIC PNL TOTAL CA: CPT | Performed by: PHYSICIAN ASSISTANT

## 2023-09-20 PROCEDURE — 250N000011 HC RX IP 250 OP 636: Mod: JZ | Performed by: PHYSICIAN ASSISTANT

## 2023-09-20 PROCEDURE — 250N000013 HC RX MED GY IP 250 OP 250 PS 637: Performed by: PHYSICIAN ASSISTANT

## 2023-09-20 PROCEDURE — 88305 TISSUE EXAM BY PATHOLOGIST: CPT | Mod: 26 | Performed by: PATHOLOGY

## 2023-09-20 PROCEDURE — 120N000001 HC R&B MED SURG/OB

## 2023-09-20 PROCEDURE — 88307 TISSUE EXAM BY PATHOLOGIST: CPT | Mod: 26 | Performed by: PATHOLOGY

## 2023-09-20 PROCEDURE — 71045 X-RAY EXAM CHEST 1 VIEW: CPT

## 2023-09-20 PROCEDURE — 88360 TUMOR IMMUNOHISTOCHEM/MANUAL: CPT | Mod: 26 | Performed by: PATHOLOGY

## 2023-09-20 RX ADMIN — ACETAMINOPHEN 1000 MG: 500 TABLET, FILM COATED ORAL at 10:29

## 2023-09-20 RX ADMIN — POLYETHYLENE GLYCOL 3350 17 G: 17 POWDER, FOR SOLUTION ORAL at 08:12

## 2023-09-20 RX ADMIN — FAMOTIDINE 20 MG: 20 TABLET ORAL at 08:12

## 2023-09-20 RX ADMIN — FAMOTIDINE 20 MG: 20 TABLET ORAL at 19:33

## 2023-09-20 RX ADMIN — SENNOSIDES AND DOCUSATE SODIUM 1 TABLET: 50; 8.6 TABLET ORAL at 08:12

## 2023-09-20 RX ADMIN — CETIRIZINE HYDROCHLORIDE 10 MG: 10 TABLET, FILM COATED ORAL at 08:11

## 2023-09-20 RX ADMIN — ACETAMINOPHEN 1000 MG: 500 TABLET, FILM COATED ORAL at 17:13

## 2023-09-20 RX ADMIN — ACETAMINOPHEN 1000 MG: 500 TABLET, FILM COATED ORAL at 05:40

## 2023-09-20 RX ADMIN — EZETIMIBE 10 MG: 10 TABLET ORAL at 10:29

## 2023-09-20 RX ADMIN — SENNOSIDES AND DOCUSATE SODIUM 1 TABLET: 50; 8.6 TABLET ORAL at 19:33

## 2023-09-20 RX ADMIN — ENOXAPARIN SODIUM 40 MG: 40 INJECTION SUBCUTANEOUS at 10:29

## 2023-09-20 RX ADMIN — KETOROLAC TROMETHAMINE 15 MG: 15 INJECTION INTRAMUSCULAR; INTRAVENOUS at 05:40

## 2023-09-20 ASSESSMENT — ACTIVITIES OF DAILY LIVING (ADL)
ADLS_ACUITY_SCORE: 28
ADLS_ACUITY_SCORE: 22
ADLS_ACUITY_SCORE: 28
ADLS_ACUITY_SCORE: 22
ADLS_ACUITY_SCORE: 28
ADLS_ACUITY_SCORE: 22
ADLS_ACUITY_SCORE: 28

## 2023-09-20 NOTE — PROGRESS NOTES
"THORACIC SURGERY POD#1    S: Doing well, pain well managed with Tylenol and Toradol alone. She usually gets nausea with opioids, so trying to avoid.     O: /61 (BP Location: Left arm)   Pulse 74   Temp 98.7  F (37.1  C) (Oral)   Resp 16   Ht 1.676 m (5' 6\")   Wt 82.6 kg (182 lb 1.6 oz)   SpO2 95%   BMI 29.39 kg/m    Gen: Sitting up in bed, alert  Resp: Regular, no distress, on room air  Incision: Dressing removed, steri-strips in place, no bleeding. ON-Q sensors were not on the skin, re-taped to the anterior abdomen.   CT: No air leak with cough. Serosanguinous output. Dressing intact, no kinking in tubing.    CXR: Lungs clear, no ptx or effusion    Plan:  - CT to water seal today  - Daily pCXR in AM  - Resp cares/ IS and flutter valve  - Out of bed for meals/ ambulate in halls  - Discontinue tele  - Regular diet  - Final pathology pending    Mey Rebollar PA-C with Dr. [facility] Thor[facility]  Office: [phone]  Cell: [phone]    "

## 2023-09-20 NOTE — PLAN OF CARE
Goal Outcome Evaluation:    [id number]    Orientations: AOx4  Vitals/Pain: VSS, RA, scheduled tylenol for pain   Lines/Drains: PIV SL, R CT to -20 water seal, small intermittent air leak  Skin/Wounds: R thoracotomy incision site RAFY, On-Q pump WDL  GI/: UOA, +BS -BM  Labs: Abnormal/Trends, Electrolyte Replacement- Recheck in the morning   Ambulation/Assist: A1 GBW, pt shaky when ambulating   Plan: Continue plan of care

## 2023-09-20 NOTE — PLAN OF CARE
Goal Outcome Evaluation:      Plan of Care Reviewed With: patient    Overall Patient Progress: improvingOverall Patient Progress: improving     Orientations: A&Ox4  Vitals/Pain: VSS (w/ soft bps @ times) on RA. Pt complains of mild to moderate pain relieved with Prn Toradol x2 and scheduled tylenol  Tele: SR w/BBB, Vpaced at 60bpm  Lines/Drains: 1 CT to -20 suction w/small int. Air leak, 224ml output overnight, 1 PIV infusing @ 75 ml/hr (SL at 0600)  Skin/Wounds: R thoracotomy incision site CDI (dressing removed @ 0600), CT site CDI, On-Q pump WDL.   GI/: Voiding, no BM on shift (passing gas, +BS), clear liquid diet can advance as tolerated  Labs: Abnormal/Trends, Electrolyte Replacement- hgb trend down from 12.4 to 10.3,    Ambulation/Assist: A1 GBW  Sleep Quality: Good  Plan: Pain management, continue IS, Pt off IMC status.

## 2023-09-21 ENCOUNTER — APPOINTMENT (OUTPATIENT)
Dept: GENERAL RADIOLOGY | Facility: CLINIC | Age: 78
DRG: 165 | End: 2023-09-21
Attending: PHYSICIAN ASSISTANT
Payer: COMMERCIAL

## 2023-09-21 LAB
GLUCOSE BLDC GLUCOMTR-MCNC: 118 MG/DL (ref 70–99)
INTERPRETATION: NORMAL
LAB DIRECTOR COMMENTS: NORMAL
LAB DIRECTOR DISCLAIMER: NORMAL
LAB DIRECTOR INTERPRETATION: NORMAL
LAB DIRECTOR METHODOLOGY: NORMAL
LAB DIRECTOR RESULTS: NORMAL
SIGNIFICANT RESULTS: NORMAL
SPECIMEN DESCRIPTION: NORMAL
SPECIMEN DESCRIPTION: NORMAL
TEST DETAILS, MDL: NORMAL

## 2023-09-21 PROCEDURE — 250N000011 HC RX IP 250 OP 636: Performed by: PHYSICIAN ASSISTANT

## 2023-09-21 PROCEDURE — 999N000065 XR CHEST PORT 1 VIEW

## 2023-09-21 PROCEDURE — 250N000013 HC RX MED GY IP 250 OP 250 PS 637: Performed by: PHYSICIAN ASSISTANT

## 2023-09-21 PROCEDURE — 120N000001 HC R&B MED SURG/OB

## 2023-09-21 RX ORDER — IBUPROFEN 200 MG
200 TABLET ORAL EVERY 6 HOURS PRN
Qty: 30 TABLET | Refills: 0 | Status: SHIPPED | OUTPATIENT
Start: 2023-09-21 | End: 2023-09-22

## 2023-09-21 RX ORDER — HYDROMORPHONE HYDROCHLORIDE 2 MG/1
1 TABLET ORAL EVERY 6 HOURS PRN
Qty: 15 TABLET | Refills: 0 | Status: SHIPPED | OUTPATIENT
Start: 2023-09-21

## 2023-09-21 RX ORDER — AMOXICILLIN 250 MG
1-3 CAPSULE ORAL 2 TIMES DAILY PRN
Qty: 30 TABLET | Refills: 0 | Status: SHIPPED | OUTPATIENT
Start: 2023-09-21

## 2023-09-21 RX ORDER — ACETAMINOPHEN 500 MG
1000 TABLET ORAL 4 TIMES DAILY
Qty: 100 TABLET | Refills: 0 | Status: SHIPPED | OUTPATIENT
Start: 2023-09-21

## 2023-09-21 RX ADMIN — ACETAMINOPHEN 1000 MG: 500 TABLET, FILM COATED ORAL at 04:16

## 2023-09-21 RX ADMIN — ACETAMINOPHEN 1000 MG: 500 TABLET, FILM COATED ORAL at 23:51

## 2023-09-21 RX ADMIN — POLYETHYLENE GLYCOL 3350 17 G: 17 POWDER, FOR SOLUTION ORAL at 08:09

## 2023-09-21 RX ADMIN — CETIRIZINE HYDROCHLORIDE 10 MG: 10 TABLET, FILM COATED ORAL at 08:09

## 2023-09-21 RX ADMIN — EZETIMIBE 10 MG: 10 TABLET ORAL at 08:09

## 2023-09-21 RX ADMIN — ACETAMINOPHEN 1000 MG: 500 TABLET, FILM COATED ORAL at 11:09

## 2023-09-21 RX ADMIN — SENNOSIDES AND DOCUSATE SODIUM 1 TABLET: 50; 8.6 TABLET ORAL at 08:09

## 2023-09-21 RX ADMIN — KETOROLAC TROMETHAMINE 15 MG: 15 INJECTION INTRAMUSCULAR; INTRAVENOUS at 08:30

## 2023-09-21 RX ADMIN — FAMOTIDINE 20 MG: 20 TABLET ORAL at 20:06

## 2023-09-21 RX ADMIN — ACETAMINOPHEN 1000 MG: 500 TABLET, FILM COATED ORAL at 17:11

## 2023-09-21 RX ADMIN — ENOXAPARIN SODIUM 40 MG: 40 INJECTION SUBCUTANEOUS at 11:09

## 2023-09-21 RX ADMIN — FAMOTIDINE 20 MG: 20 TABLET ORAL at 08:09

## 2023-09-21 ASSESSMENT — ACTIVITIES OF DAILY LIVING (ADL)
ADLS_ACUITY_SCORE: 24
ADLS_ACUITY_SCORE: 24
ADLS_ACUITY_SCORE: 28
ADLS_ACUITY_SCORE: 24
ADLS_ACUITY_SCORE: 28
ADLS_ACUITY_SCORE: 24

## 2023-09-21 NOTE — PLAN OF CARE
Goal Outcome Evaluation:       A&O x4. VSS on RA. Lungs sounds - Diminished. Bowel Sounds normoactive. Urine Output - adequate. Incisions - CT site, dressed and minimal drainage. Ambulation - Assist 1 GBW, moving well. Diet - Regular diet, tolerating well. Pain controlled by Tylenol and PRN toradol.  Plan to clamp CT this evening and possible discharge tomorrow.

## 2023-09-21 NOTE — PLAN OF CARE
Neuro: A&O x4  Tele/cardiac: N/A  Respiration: on RA, encourage IS and flutter valve use  Activity: A1 GBW  Pain: tylenol for incisional pain  Drips: PIV SL  Drains/tubes: 1:1 chest tube to water seal, serosanguinous drainage, air leak with cough, no crepitus  Skin: R. Thoracotomy with steri strips, RAFY, On-Q pump infusing, sensors taped to skin, chest tube dressing changed this shift  GI/: continent, regular diet  Aggression color: green  Isolation: none  Plan: daily chest xray

## 2023-09-21 NOTE — DISCHARGE INSTRUCTIONS
[facility]   Discharge instructions and Follow-Up Care for Dr. Patel' Thoracoscopy and Thoracotomy patients:    You already have a post-op chest x-ray and post-op appointment scheduled as follows:  Go to [facility] ([address] [place] [address], [address], [place] [address]) at _1:05 pm_____on__Monday, Oct. 2__ for a post-op chest x-ray.  Then go upstairs to the [facility] office in [address] at __1:40 pm____ on the same day for your post-op appointment. You will see either Nathalie Leahy PA-C or Dr. Patel for your post-op appointment.     You need to call to schedule your post-op chest x-ray and appointment: Priyanka [phone] or Caroline [phone]    Patient care:  Call Dr. Patel' office [phone] if you experience:   *Severe chills or fever of 101 F or sariah on two occasions   *Severely increased incisional pain that cannot be relieved with rest or pain medications   *Presence of unusual incisional or chest tube site drainage that is odorous, green or yellow in color, marc bright red blood or if your incision is warm/red/swollen   *Coughing up bright red blood or greenish-yellow secretions   *Inability to urinate or have a bowel movement   *New pain or swelling in your legs    In an emergency, call [phone] or have someone drive you to an Emergency Department      No driving while on narcotic pain medicines.    Activity:  _XXX__No lifting greater than 10 pounds with your operative side arm for the next 4 weeks while you heal    Wound Care:   *Please look at your incisions daily and keep them clean while they heal   *Do not apply creams, salves such as Bacitracin, or ointments on the incisions while they heal   *Steri strips (thin white pieces of tape) will be present on the incision(s) and will peel off as your incision heals-- otherwise, they will be removed at your post-op appointment   *Remove the dressing covering your chest tube site 48  hours after your discharge from the hospital. You may then shower. Wash the incision and chest tube sites daily with soap and water. No bathing/immersing incisions under water for two weeks or until the chest tube sites are completely healed.   *After your shower, pat the chest tube sites dry and place a dry gauze dressing (and tape) over the sites. It is normal to have some drainage from the sites for a few days. Do not be alarmed if a large amount of fluid drains (should be pink or yellow) either spontaneously or with coughing or exertion. Should this happen, just place a larger, thicker gauze dressing over the chest tube sites. In about a week, drainage should stop and a scab will form. Once drainage stops, you can stop covering the sites. Call our office if the drainage is milky or green in color or foul-smelling.    Respiratory Care:  Utilize the incentive spirometer and flutter valve/acapella (if you received one) several times in a row every few hours while awake for a few weeks after discharging home from the hospital.    Activity:  It may take a few weeks to regain your normal energy level/stamina. It is important during your recovery to get regular physical activity such as walking each day, climbing stairs as tolerated, and avoiding prolonged daytime naps

## 2023-09-21 NOTE — PROGRESS NOTES
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 Final pathologic stage [id number], Final Stage IIA    CXR: no PTX, right chest tube in good position, lungs clear  CT: 480 ml serosang output over 24 hours    S: Doing well- Toradol and tylenol are managing pain. Walking and using IS and flutter valve regularly. No N/V nor SOB. Discussed clamp trial and anticipation of home tomorrow. Discussed final path and staging and need for Med Onc outpatient for consideration of adjuvant tx.  On-Q: infusing  CT: no bleeding, no air leak with cough  Inc: benign, no erythema, no swelling  P: Clamp CT tonight  Anticipate home tomorrow    Nathalie Leahy PA-C with Dr. Peter Patel  [facility]  Cell [phone]      "

## 2023-09-21 NOTE — PLAN OF CARE
Goal Outcome Evaluation:  DATE & TIME: 09/21/23, [id number]    Cognitive Concerns/ Orientation : A & O times four  BEHAVIOR & AGGRESSION TOOL COLOR: calm  ABNL VS/O2: VSS, room air  MOBILITY: SBA, walker and GB  PAIN MANAGMENT: denied  DIET: regular  BOWEL/BLADDER: voids per BRP  ABNL LAB/BG:   DRAIN/DEVICES: PIV SL  SKIN: right lung incision, CT site  TESTS/PROCEDURES:   D/C DATE: Pending, possibly tomorrow  OTHER IMPORTANT INFO:  Plan to clamp chest tube at midnight.  Encouraged use of IS. Ambulated in the [address]idal way. Continue to monitor,

## 2023-09-22 ENCOUNTER — APPOINTMENT (OUTPATIENT)
Dept: GENERAL RADIOLOGY | Facility: CLINIC | Age: 78
DRG: 165 | End: 2023-09-22
Attending: PHYSICIAN ASSISTANT
Payer: COMMERCIAL

## 2023-09-22 LAB
PATH REPORT.ADDENDUM SPEC: ABNORMAL
PATH REPORT.COMMENTS IMP SPEC: ABNORMAL
PATH REPORT.COMMENTS IMP SPEC: YES
PATH REPORT.FINAL DX SPEC: ABNORMAL
PATH REPORT.GROSS SPEC: ABNORMAL
PATH REPORT.INTRAOP OBS SPEC DOC: ABNORMAL
PATH REPORT.MICROSCOPIC SPEC OTHER STN: ABNORMAL
PATH REPORT.RELEVANT HX SPEC: ABNORMAL
PATHOLOGY SYNOPTIC REPORT: ABNORMAL
PHOTO IMAGE: ABNORMAL
PLATELET # BLD AUTO: 226 10E3/UL (ref 150–450)

## 2023-09-22 PROCEDURE — G0452 MOLECULAR PATHOLOGY INTERPR: HCPCS | Mod: 26 | Performed by: PATHOLOGY

## 2023-09-22 PROCEDURE — 71045 X-RAY EXAM CHEST 1 VIEW: CPT

## 2023-09-22 PROCEDURE — 250N000011 HC RX IP 250 OP 636: Mod: JZ | Performed by: PHYSICIAN ASSISTANT

## 2023-09-22 PROCEDURE — 81455 SO/HL 51/>GSAP DNA/DNA&RNA: CPT | Performed by: THORACIC SURGERY (CARDIOTHORACIC VASCULAR SURGERY)

## 2023-09-22 PROCEDURE — 120N000001 HC R&B MED SURG/OB

## 2023-09-22 PROCEDURE — 250N000009 HC RX 250: Performed by: PHYSICIAN ASSISTANT

## 2023-09-22 PROCEDURE — 85049 AUTOMATED PLATELET COUNT: CPT | Performed by: PHYSICIAN ASSISTANT

## 2023-09-22 PROCEDURE — 250N000013 HC RX MED GY IP 250 OP 250 PS 637: Performed by: PHYSICIAN ASSISTANT

## 2023-09-22 PROCEDURE — 36415 COLL VENOUS BLD VENIPUNCTURE: CPT | Performed by: PHYSICIAN ASSISTANT

## 2023-09-22 PROCEDURE — G0452 MOLECULAR PATHOLOGY INTERPR: HCPCS | Mod: 26 | Performed by: STUDENT IN AN ORGANIZED HEALTH CARE EDUCATION/TRAINING PROGRAM

## 2023-09-22 RX ADMIN — EZETIMIBE 10 MG: 10 TABLET ORAL at 08:15

## 2023-09-22 RX ADMIN — ACETAMINOPHEN 1000 MG: 500 TABLET, FILM COATED ORAL at 05:21

## 2023-09-22 RX ADMIN — ACETAMINOPHEN 1000 MG: 500 TABLET, FILM COATED ORAL at 11:36

## 2023-09-22 RX ADMIN — ENOXAPARIN SODIUM 40 MG: 40 INJECTION SUBCUTANEOUS at 10:14

## 2023-09-22 RX ADMIN — ACETAMINOPHEN 1000 MG: 500 TABLET, FILM COATED ORAL at 17:03

## 2023-09-22 RX ADMIN — BACITRACIN: 500 OINTMENT TOPICAL at 05:28

## 2023-09-22 RX ADMIN — ACETAMINOPHEN 1000 MG: 500 TABLET, FILM COATED ORAL at 23:59

## 2023-09-22 RX ADMIN — CETIRIZINE HYDROCHLORIDE 10 MG: 10 TABLET, FILM COATED ORAL at 08:15

## 2023-09-22 RX ADMIN — FAMOTIDINE 20 MG: 20 TABLET ORAL at 08:15

## 2023-09-22 RX ADMIN — FAMOTIDINE 20 MG: 20 TABLET ORAL at 20:44

## 2023-09-22 ASSESSMENT — ACTIVITIES OF DAILY LIVING (ADL)
ADLS_ACUITY_SCORE: 24
ADLS_ACUITY_SCORE: 22
ADLS_ACUITY_SCORE: 22
ADLS_ACUITY_SCORE: 24
ADLS_ACUITY_SCORE: 22
ADLS_ACUITY_SCORE: 24
ADLS_ACUITY_SCORE: 22
ADLS_ACUITY_SCORE: 22

## 2023-09-22 NOTE — PROGRESS NOTES
"Thoracic Surgery POD #3:  BP (!) 165/90 (BP Location: Left arm)   Pulse 61   Temp 97.6  F (36.4  C) (Oral)   Resp 18   Ht 1.676 m (5' 6\")   Wt 82.6 kg (182 lb 1.6 oz)   SpO2 94%   BMI 29.39 kg/m    CXR: no PTX with CT clamped overnight  CT: 805 ml serosang output over 24 hours    S: Doing well except more uncomfortable with pain at chest tube site and anterior to it. Walking and using IS. No SOB. Discussed keeping chest tube in one more day due to high output. Also discussed pain control and discharge instructions.  O: Inc: benign  CT: no air leak, serosang output  On-Q: infusing  P: Keep CT in on water seal for one more day-- awaiting decrease in output  Try dilaudid 1 mg for better pain control-- take with anti-emetic prn (past hx nausea with narcotics)  Ambulate- IS    Nathalie Leahy PA-C with Dr. Peter Patel  [facility]  Cell [phone]    "

## 2023-09-22 NOTE — PROGRESS NOTES
Orientations: A&O x4  Vitals/Tele: VSS - occassional O2 destats. On room air  Pain & sleep : 5-6/10 ar 2200, incisional and difficult to take deep breaths. Repositioned, incentive spirometer, and tylenol brought paint down to 3/10. Able to sleep between cares and states she can take a deep breath.   Lines/Drains: chest tube clamped at 0000, 145 mL drained when unclamped. L - PIV, saline lock  Skin/Wounds: changed dressing on thoracic site, moderate drainage.   GI/:  states she has a little more appetite. Had several loose stools today.   Ambulation/Assist: SBA assist w/ walker.   Plan: continue plan of care. Encourage incentive spirometer.

## 2023-09-22 NOTE — PLAN OF CARE
Care plan note:    Patient Information  Name: Rhianna Collins  Age: 78 year old  Diagnosis: Right lower lobe lung mass  Nodule of lower lobe of right lung      Recent Vitals:  Temp: 98  F (36.7  C) Temp src: Oral BP: (!) 151/80 Pulse: 62   Resp: 18 SpO2: 97 % O2 Device: None (Room air)     Assessment:   Orientation/Neuro: Alert and Oriented x 4  Pain: Pain is controlled with current analgesics.  Medication(s) being used: acetaminophen   Tele: NA.   IV medications: None   Mobility: St. by assist   Skin: Incision: Right uppe back.   GI: WDL  : WDL     Diet: Tolerating diet:   Well  Orders Placed This Encounter      Regular Diet Adult      Diet      Safety/Concerns:  None  Aggression Color: Green    Plan:  Concerns/abnormal assessment: None.  If abnormal assessment, provider notified: N/A  Plan/Other: Possible chest tube removal tomorrow.     Alessandro Duggan RN

## 2023-09-23 ENCOUNTER — APPOINTMENT (OUTPATIENT)
Dept: GENERAL RADIOLOGY | Facility: CLINIC | Age: 78
DRG: 165 | End: 2023-09-23
Attending: PHYSICIAN ASSISTANT
Payer: COMMERCIAL

## 2023-09-23 VITALS
OXYGEN SATURATION: 98 % | BODY MASS INDEX: 29.27 KG/M2 | WEIGHT: 182.1 LBS | HEIGHT: 66 IN | DIASTOLIC BLOOD PRESSURE: 82 MMHG | TEMPERATURE: 98.1 F | SYSTOLIC BLOOD PRESSURE: 164 MMHG | RESPIRATION RATE: 16 BRPM | HEART RATE: 59 BPM

## 2023-09-23 LAB
CREAT SERPL-MCNC: 0.71 MG/DL (ref 0.51–0.95)
EGFRCR SERPLBLD CKD-EPI 2021: 88 ML/MIN/1.73M2

## 2023-09-23 PROCEDURE — 82565 ASSAY OF CREATININE: CPT | Performed by: THORACIC SURGERY (CARDIOTHORACIC VASCULAR SURGERY)

## 2023-09-23 PROCEDURE — 71045 X-RAY EXAM CHEST 1 VIEW: CPT

## 2023-09-23 PROCEDURE — 250N000013 HC RX MED GY IP 250 OP 250 PS 637: Performed by: PHYSICIAN ASSISTANT

## 2023-09-23 PROCEDURE — 250N000011 HC RX IP 250 OP 636: Mod: JZ | Performed by: PHYSICIAN ASSISTANT

## 2023-09-23 PROCEDURE — 36415 COLL VENOUS BLD VENIPUNCTURE: CPT | Performed by: THORACIC SURGERY (CARDIOTHORACIC VASCULAR SURGERY)

## 2023-09-23 RX ADMIN — FAMOTIDINE 20 MG: 20 TABLET ORAL at 09:02

## 2023-09-23 RX ADMIN — ACETAMINOPHEN 1000 MG: 500 TABLET, FILM COATED ORAL at 05:00

## 2023-09-23 RX ADMIN — CETIRIZINE HYDROCHLORIDE 10 MG: 10 TABLET, FILM COATED ORAL at 09:02

## 2023-09-23 RX ADMIN — ENOXAPARIN SODIUM 40 MG: 40 INJECTION SUBCUTANEOUS at 10:35

## 2023-09-23 RX ADMIN — SENNOSIDES AND DOCUSATE SODIUM 1 TABLET: 50; 8.6 TABLET ORAL at 09:01

## 2023-09-23 RX ADMIN — ACETAMINOPHEN 1000 MG: 500 TABLET, FILM COATED ORAL at 13:36

## 2023-09-23 RX ADMIN — EZETIMIBE 10 MG: 10 TABLET ORAL at 09:01

## 2023-09-23 ASSESSMENT — ACTIVITIES OF DAILY LIVING (ADL)
ADLS_ACUITY_SCORE: 22

## 2023-09-23 NOTE — PROGRESS NOTES
THORACIC SURGERY POD #4    Doing well  AVSS on RA  Decreasing CT output    CXR looks good    CT out    D/C today    JOSE L MOREL MD [facility] ONCOLOGY THORACIC SURGERY  CELL:  [phone]  OFFICE: [phone]

## 2023-09-23 NOTE — CARE PLAN
Orientations: A&O X4.   Vitals/Pain: VSS on RA. Pain managed with scheduled tylenol. .   Lines/Drains: PIV SL. CT to WS, no air leak, 200 ml output overnight  Skin/Wounds: R. Thoracotomy site UTV, CT site WDL, dressing changed.   GI/: Adequate UOP. No BM   Labs: Abnormal/Trends, Electrolyte Replacement- N/A  Ambulation/Assist: SBA gbw.   Sleep Quality: Good

## 2023-09-23 NOTE — PLAN OF CARE
A&O x 4. VSS on RA. Pain controlled w/ scheduled tyelnol. Adequate UOP. No BM during shift    Pt discharged to home. Discharge education completed w/ all questions answered/encouraged. Pt left floor at 1500 via with all home belongings and discharge medication.

## 2023-09-26 NOTE — DISCHARGE SUMMARY
THORACIC SURGERY HOSPITAL DISCHARGE SUMMARY  [facility] ONCOLOGY - THORACIC SURGERY  [address], [address]  [place] [address]  Phone [phone]  [email or web address]    9/26/2023     Sangeeta Tang   [address] [place] / [place] [address]  Phone: [phone]   Fax: [phone]        Re: Rhianna Collins             1945             [id number]              Dates of Hospitalization: 9/19/2023 - 9/23/2023   Date of Service (when I saw the patient): 9/23/23    Dear Dr. Tang:     As you are aware, we had the pleasure of caring for your patient,  Rhianna Collins here at [facility].  She is a 78-year-old woman who was found to have a masslike area at the base of the right lower lobe lung.  This was hypermetabolic on the PET/CT scan.  There is no evidence of martina or distant disease.  Pulmonary function tests are normal.  Options of diagnostic procedure and treatment were discussed in details and it was elected to proceed with resection for diagnosis and treatment     On 9/19/2023, Dr. Peter Patel performed the following:    Procedure/Surgery Information   Procedure: Right video assisted thoracoscopy with wedge resection of anterior right lower lobe lung nodule, limited right thoracotomy, wedge resection right lower lobe lung nodule, right lower lobectomy with mediastinal lymph node dissection    Surgeon(s): Dr. Peter Leahy PA-C [facility]   Specimens: ID Type Source Tests Collected by Time Destination   1 : RIGHT LOWER LOBE LUNG NODULE Tissue Lung, Lower Lobe, Right SURGICAL PATHOLOGY EXAM Peter Patel MD 9/19/2023 10:16 AM    2 : RIGHT LOWER LOBE LUNG NODULE # 2 Tissue Lung, Lower Lobe, Right SURGICAL PATHOLOGY EXAM Peter Patel MD 9/19/2023 10:32 AM    3 : #7 SUBCARINAL LYMPH NODE Tissue Lymph Node(s), Subcarinal, Right SURGICAL PATHOLOGY EXAM Peter Patel MD 9/19/2023 10:39  AM    4 : 4R RIGHT PARATRACHEAL LYMPH NODE Tissue Lymph Node(s), Paratracheal, Right SURGICAL PATHOLOGY EXAM Peter Patel MD 9/19/2023 10:49 AM    5 : # 11 INTERLOBAR LYMPH NODE Tissue Lymph Node(s) SURGICAL PATHOLOGY EXAM Peter Patel MD 9/19/2023 11:15 AM    6 : RIGHT LOWER LOBE LUNG Tissue Lung, Lower Lobe, Right SURGICAL PATHOLOGY EXAM Peter Patel MD 9/19/2023 11:20 AM             Final Surgical Pathology Revealed:  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 All mediastinal lymph nodes benign. Final pathologic stage [id number], Final Stage IIA.    Her post-operative course was unremarkable.      Consultations This Hospital Stay   None    Rhianna Collins has otherwise recovered sufficiently to be discharged to home today, 9/23/2023, on post-operative day number four for further convalescence.  Her incisions are healing well with no signs or symptoms of infection.  Her bowels have moved sufficiently and she is tolerating diet and activity, ambulating and transferring independently.  She is currently afebrile with stable vital signs.     Below, you will find a full discharge medication list and instructions.  We have arranged for Rhianna Collins to follow-up with us in our [facility] in 7-10 days with a Chest X-ray prior to that appointment.  We thank you for allowing us to participate in the care of Rhianna Collins here at [facility].  Please feel free to contact our office at [phone] with any questions or concerns or if we can be of any further assistance in the care of this patient.    Sincerely,    Dr. Peter Patel MD    D/C Summary Prepared by: Nathalie Leahy PA-C    Discharge Medications:  Discharge Medication List as of 9/23/2023  1:26 PM        START taking these medications    Details   acetaminophen (TYLENOL) 500 MG tablet Take 2 tablets (1,000 mg) by mouth 4  times daily, Disp-100 tablet, R-0, E-Prescribe      HYDROmorphone (DILAUDID) 2 MG tablet Take 0.5 tablets (1 mg) by mouth every 6 hours as needed for moderate to severe pain, Disp-15 tablet, R-0, E-Prescribe      senna-docusate (SENOKOT-S/PERICOLACE) 8.6-50 MG tablet Take 1-3 tablets by mouth 2 times daily as needed for constipation, Disp-30 tablet, R-0, E-Prescribe           CONTINUE these medications which have CHANGED    Details   rivaroxaban ANTICOAGULANT (XARELTO) 20 MG TABS tablet Take 1 tablet (20 mg) by mouth daily (with dinner), Historical           CONTINUE these medications which have NOT CHANGED    Details   Calcium Carb-Cholecalciferol 600-10 MG-MCG CAPS Take 1 capsule by mouth 2 times daily, Historical      cetirizine (ZYRTEC) 10 MG tablet Take 10 mg by mouth daily, Historical      coenzyme Q-10 capsule Take 1 capsule by mouth daily, Historical      ezetimibe (ZETIA) 10 MG tablet Take 10 mg by mouth daily, Historical      Magnesium Oxide -Mg Supplement 500 MG TABS Take 1 tablet by mouth daily, Historical      vitamin C (ASCORBIC ACID) 1000 MG TABS Take 1,000 mg by mouth daily, Historical               Discharge Instructions:  1) Remove chest tube dressing on 9/25/23 and then it is Ok to shower.  Please wash both incision and chest tube site daily with soap and water.  You may cover the chest tube site daily with a clean band-aid or dry gauze if it continues to drain.  Once it stops draining, leave the site open to air and it will form a scab.  2) Steri-strips can be removed in 1 week or they will fall off when they are ready.  3) Continue daily use of your Incentive Spirometer, set of 10x in a row, every 1-2 hours while you are awake during the day. Also use your flutter valve if you received one during your stay.   4) No lifting, pushing or pulling >10 lbs for 4 weeks from the day of your surgery.    5) No driving while on narcotic pain medications.    Follow-Up Care:  1) Follow up with Nathalie Leahy,  MAMADOU/Dr. Patel at the [place] [facility] in [place] ([address], [address], [place] [address]).  Call Priyanka at [phone] to schedule the appointment.  2) Follow up with Primary Care Provider, Sangeeta Tang within 1 month of discharge for routine post-surgical care, wound check and follow up.  Please call [phone] to arrange this appointment.       CC  Patient Care Team:  Sangeeta Tang DO as PCP - General (Family Medicine)

## 2023-10-02 ENCOUNTER — ANCILLARY PROCEDURE (OUTPATIENT)
Dept: GENERAL RADIOLOGY | Facility: CLINIC | Age: 78
End: 2023-10-02
Attending: THORACIC SURGERY (CARDIOTHORACIC VASCULAR SURGERY)
Payer: COMMERCIAL

## 2023-10-02 PROCEDURE — 71046 X-RAY EXAM CHEST 2 VIEWS: CPT

## 2023-12-31 ENCOUNTER — HEALTH MAINTENANCE LETTER (OUTPATIENT)
Age: 78
End: 2023-12-31

## 2025-01-19 ENCOUNTER — HEALTH MAINTENANCE LETTER (OUTPATIENT)
Age: 80
End: 2025-01-19

## (undated) DEVICE — Device

## (undated) DEVICE — GLOVE BIOGEL PI ULTRATOUCH G SZ 7.5 42175

## (undated) DEVICE — ESU PENCIL W/HOLSTER E2350H

## (undated) DEVICE — SUCTION DRY CHEST DRAIN OASIS 3600-100

## (undated) DEVICE — LINEN GOWN OVERSIZE 5408

## (undated) DEVICE — TAPE DRSG UNIVERSAL CLOTH 3" WHITE LATEX 881-3

## (undated) DEVICE — TUBING SUCTION MEDI-VAC SOFT 3/16"X20' N520A

## (undated) DEVICE — ESU CORD MONOPOLAR 10'  E0510

## (undated) DEVICE — LINEN TOWEL PACK X5 5464

## (undated) DEVICE — SOL NACL 0.9% IRRIG 1000ML BOTTLE 2F7124

## (undated) DEVICE — DRAPE POUCH INSTRUMENT 1018

## (undated) DEVICE — DRAPE BREAST/CHEST 29420

## (undated) DEVICE — ESU ELEC BLADE 6" COATED/INSULATED E1455-6

## (undated) DEVICE — STPL RELOAD REG/THK TISSUE ECHELON 60 X 3.8MM GOLD GST60D

## (undated) DEVICE — PREP CHLORAPREP 26ML TINTED HI-LITE ORANGE 930815

## (undated) DEVICE — ESU GROUND PAD UNIVERSAL W/O CORD

## (undated) DEVICE — SU SILK 2 REEL 60" SA8H

## (undated) DEVICE — STPL SKIN SUBCUTICULAR INSORB  2030

## (undated) DEVICE — CATH ON-Q PAIN SILVER SKR 2.5" PM010-A

## (undated) DEVICE — CLIP APPLIER 11" MED LIGACLIP MCM20

## (undated) DEVICE — DRSG GAUZE 4X4" 3033

## (undated) DEVICE — STPL POWERED ECHELON VASC 35MM PVE35A

## (undated) DEVICE — BLADE KNIFE SURG 15 371115

## (undated) DEVICE — ANTIFOG SOLUTION W/FOAM PAD 31142527

## (undated) DEVICE — SYSTEM LAPAROVUE VISIBILITY LAPVUE10

## (undated) DEVICE — SU VICRYL 2-0 CT-2 27" J333H

## (undated) DEVICE — STPL ENDO ARTICULATING 60MM EC60A

## (undated) DEVICE — SURGICEL HEMOSTAT 3X4" NUKNIT 1943

## (undated) DEVICE — SUCTION CANISTER MEDIVAC LINER 3000ML W/LID 65651-530

## (undated) DEVICE — ENDO POUCH UNIV RETRIEVAL SYSTEM INZII 10MM CD001

## (undated) DEVICE — DRAIN CHEST TUBE 28FR STR 8028

## (undated) DEVICE — SOL WATER IRRIG 1000ML BOTTLE 2F7114

## (undated) DEVICE — SYR BULB IRRIG DOVER 60 ML LATEX FREE 67000

## (undated) DEVICE — BLADE KNIFE SURG 10 371110

## (undated) DEVICE — NDL 22GA 1.5"

## (undated) DEVICE — STPL LINEAR CUT 30X3.5MM TX30B

## (undated) DEVICE — PACK MINOR SBA15MIFSE

## (undated) DEVICE — SPONGE LAP 18X18" X8435

## (undated) DEVICE — SU NDL CUT REV MED 3/8 209014

## (undated) DEVICE — SU VICRYL 4-0 PS-2 18" UND J496H

## (undated) DEVICE — SU VICRYL 1 CT-2 27" J335H

## (undated) DEVICE — GLOVE BIOGEL PI ULTRATOUCH G SZ 6.5 42165

## (undated) DEVICE — DRAPE IOBAN INCISE 23X17" 6650EZ

## (undated) DEVICE — DRSG KERLIX 4 1/2"X4YDS ROLL 6715

## (undated) DEVICE — ENDO SCOPE WARMER LF TM500

## (undated) DEVICE — ENDO TROCAR THORACIC 10/12MM TT012

## (undated) RX ORDER — CEFAZOLIN SODIUM/WATER 2 G/20 ML
SYRINGE (ML) INTRAVENOUS
Status: DISPENSED
Start: 2023-09-19

## (undated) RX ORDER — PROPOFOL 10 MG/ML
INJECTION, EMULSION INTRAVENOUS
Status: DISPENSED
Start: 2023-09-19

## (undated) RX ORDER — FENTANYL CITRATE 50 UG/ML
INJECTION, SOLUTION INTRAMUSCULAR; INTRAVENOUS
Status: DISPENSED
Start: 2023-09-19

## (undated) RX ORDER — EPHEDRINE SULFATE 50 MG/ML
INJECTION, SOLUTION INTRAMUSCULAR; INTRAVENOUS; SUBCUTANEOUS
Status: DISPENSED
Start: 2023-09-19

## (undated) RX ORDER — BUPIVACAINE HYDROCHLORIDE 5 MG/ML
INJECTION, SOLUTION EPIDURAL; INTRACAUDAL
Status: DISPENSED
Start: 2023-09-19